# Patient Record
Sex: FEMALE | Race: BLACK OR AFRICAN AMERICAN
[De-identification: names, ages, dates, MRNs, and addresses within clinical notes are randomized per-mention and may not be internally consistent; named-entity substitution may affect disease eponyms.]

---

## 2020-02-26 ENCOUNTER — HOSPITAL ENCOUNTER (OUTPATIENT)
Dept: HOSPITAL 5 - MAMMO | Age: 75
Discharge: HOME | End: 2020-02-26
Attending: FAMILY MEDICINE
Payer: MEDICARE

## 2020-02-26 DIAGNOSIS — Z78.0: ICD-10-CM

## 2020-02-26 DIAGNOSIS — E04.9: ICD-10-CM

## 2020-02-26 DIAGNOSIS — Z13.820: ICD-10-CM

## 2020-02-26 DIAGNOSIS — Z12.31: Primary | ICD-10-CM

## 2020-02-26 DIAGNOSIS — N64.89: ICD-10-CM

## 2020-02-26 PROCEDURE — 77080 DXA BONE DENSITY AXIAL: CPT

## 2020-02-26 PROCEDURE — 76536 US EXAM OF HEAD AND NECK: CPT

## 2020-02-26 PROCEDURE — 77067 SCR MAMMO BI INCL CAD: CPT

## 2020-02-26 NOTE — MAMMOGRAPHY REPORT
DIGITAL SCREENING MAMMOGRAM WITH CAD, 2/26/2020



INDICATION: Routine screening mammography. 



TECHNIQUE:  Digital bilateral  2D mammography was obtained in the craniocaudal and mediolateral obliq
ue projections. This examination was interpreted with the benefit of Computer-Aided Detection analysi
s.



COMPARISON: None available. However, she indicated that she had a prior mammogram at either [facility] approximately 2 years ago.



FINDINGS: 



Breast Density: There are scattered areas of fibroglandular density.



There is no evidence of dominant mass, suspicious calcifications or architectural distortion in eithe
r breast.



IMPRESSION: No mammographic evidence of malignancy.





Follow up recommendation: Routine yearly



BI-RADS Category 1:  Negative.



A "normal" or negative report should not discourage follow up or biopsy of a clinically significant f
inding.



A written summary of these findings will be mailed to the patient. The patient will be entered into a
 mammography reporting system which will generate a reminder letter for the patient's next appointmen
t at the appropriate interval.



The [employer] recommends yearly mammograms starting at age 40 and continuing as l
autumn as a woman is in good health.  Breast MRI is recommended for women with an approximate 20-25% or 
greater lifetime risk of breast cancer, including women with a strong family history of breast or ova
anthony cancer or who have been treated for Hodgkin's disease.



Signer Name: Kareem Jeff MD 

Signed: 2/26/2020 11:39 AM

 Workstation Name: [id number]

## 2020-02-26 NOTE — MAMMOGRAPHY REPORT
BONE DEXA



CLINICAL: Postmenopausal.



TECHNIQUE: 2 site bone DEXA performed on an Hologic scanner.



FINDINGS:



The average BMD of the lumbar spine L1-L4 is 1.298g/cm squared with a T score of +2.3 and a Z score o
f +4.7. 



The average total BMD of the left hip is 1.099 g/cm squared with a T score of +1.3and a Z score of +3
.1. 





IMPRESSION:

1. WHO classification: Normal with average fracture risk based on spine measurements.

2. WHO classification Normal with average fracture risk based on left hip measurements.



RECOMMENDATION: Clinical correlation and routine screening.



Definitions:

BMD equal bone mineral density

T score = BMD related to peak bone mass of young adult (Zoltan expressed an standard deviation)

Z score = age-matched BMD expressed in SD

World health organization (WHO) diagnostic criteria

Normal T score greater than equal to 1 standard deviation

Osteopenia T score between -1 and -2.4 standard deviation

Osteoporosis T score -2.5 standard deviation or below.



Note: BMD is not the only risk factor for fracture; also consider factors such as the patient's age, 
risk of falling, previous osteoporotic fracture, family history of osteoporotic fractures, current sm
oker and low body weight.



Z scores are not calculated if greater than 80 years of age.



Signer Name: Kareem Jeff MD 

Signed: 2/26/2020 11:41 AM

 Workstation Name: [id number]

## 2024-05-01 NOTE — ULTRASOUND REPORT
A1c is 7.5.  This is improved.  Recheck in 6 months.   THYROID ULTRASOUND



HISTORY: E03.9Hypothyroidism, unspecified



COMPARISON: None



TECHNIQUE: Routine sonographic images were obtained of the thyroid.



FINDINGS:

Right thyroid lobe:  Small with a heterogeneous echo pattern. A medial lower pole solid oval hypoecho
ic nodule measures 1.1 x 0.9 x 0.7 cm. The right thyroid lobe measures 2.3 x 1.2 x 1.0 cm.

Isthmus:   No significant abnormality. The isthmus measures 2 mm.

Left thyroid lobe:  Small with a heterogeneous echo pattern. The left thyroid lobe measures 3.2 x 0.6
 x 0.8 cm.



Additional Findings: None.



IMPRESSION:

1. Small thyroid with a pattern suggestive of Hashimoto's thyroiditis.

2. A single 1.1 cm right thyroid nodule. Recommend 6 month follow-up ultrasound to reevaluate the siz
e of the nodule.



Signer Name: Kareem Jeff MD 

Signed: 2/26/2020 11:15 AM

 Workstation Name: [id number]